# Patient Record
(demographics unavailable — no encounter records)

---

## 2024-11-09 NOTE — HISTORY OF PRESENT ILLNESS
[FreeTextEntry1] :  Patient is a 37 year old P0 presenting for an annual visit. LMP 10/18/2024. She is feeling well and is without complaints. She denies vaginal itching, odor and discharge. Denies urinary urgency, frequency and dysuria. She is currently sexually active.   OBHx: OBHx: 2006, TOP, DVC

## 2024-11-09 NOTE — PHYSICAL EXAM
[Chaperone Present] : A chaperone was present in the examining room during all aspects of the physical examination [41126] : A chaperone was present during the pelvic exam. [Appropriately responsive] : appropriately responsive [Alert] : alert [No Acute Distress] : no acute distress [Soft] : soft [Non-tender] : non-tender [Non-distended] : non-distended [No HSM] : No HSM [No Lesions] : no lesions [No Mass] : no mass [Oriented x3] : oriented x3 [Examination Of The Breasts] : a normal appearance [No Masses] : no breast masses were palpable [Labia Majora] : normal [Labia Minora] : normal [Normal] : normal [Uterine Adnexae] : normal [FreeTextEntry2] : Jazmine Carlson

## 2024-11-09 NOTE — PHYSICAL EXAM
[Chaperone Present] : A chaperone was present in the examining room during all aspects of the physical examination [21166] : A chaperone was present during the pelvic exam. [Appropriately responsive] : appropriately responsive [Alert] : alert [No Acute Distress] : no acute distress [Soft] : soft [Non-tender] : non-tender [Non-distended] : non-distended [No HSM] : No HSM [No Lesions] : no lesions [No Mass] : no mass [Oriented x3] : oriented x3 [Examination Of The Breasts] : a normal appearance [No Masses] : no breast masses were palpable [Labia Majora] : normal [Labia Minora] : normal [Normal] : normal [Uterine Adnexae] : normal [FreeTextEntry2] : Jazmine Carlson

## 2024-11-09 NOTE — DISCUSSION/SUMMARY
[FreeTextEntry1] : 37 year old P0 presenting for annual exam -f/u pap and GC/CT -Contraception: Trying to conceive; recommended Ovasitol powder, trying to conceive paperwork given -f/u PRN

## 2025-04-17 NOTE — PROCEDURE
[Transvaginal OB Sonogram] : Transvaginal OB Sonogram [Intrauterine Pregnancy] : intrauterine pregnancy [Yolk Sac] : yolk sac present [Fetal Heart] : fetal heart present [Transvaginal OB Sonogram WNL] : Transvaginal OB Sonogram WNL [FreeTextEntry1] : [id number]

## 2025-04-17 NOTE — DISCUSSION/SUMMARY
[FreeTextEntry1] : 37 year old P0 at 7w5d by LMP presenting with amenorrhea -f/u prenatal blood work drawn today -OB sonogram done- JW=378 -Scoliosis- Will schedule patient with Anesiaologist -RX sent for ASA to take at 12 weeks -Routine PNC reviewed including nutrition, exercise and safe medications in pregnancy -Depression Screening done today: PHQ2 = 1 -f/u 2 weeks for confirmation of pregnancy dating sonogram

## 2025-04-17 NOTE — PROCEDURE
[Transvaginal OB Sonogram] : Transvaginal OB Sonogram [Intrauterine Pregnancy] : intrauterine pregnancy [Yolk Sac] : yolk sac present [Fetal Heart] : fetal heart present [Transvaginal OB Sonogram WNL] : Transvaginal OB Sonogram WNL [FreeTextEntry1] : [id number]

## 2025-04-17 NOTE — HISTORY OF PRESENT ILLNESS
[FreeTextEntry1] : Patient is a 37 year  old . LMP 2025. She had a positive home pregnancy test. She is endorsing occasional nausea and breast tenderness.  OBHx:  2006, TOP, DVC

## 2025-04-17 NOTE — DISCUSSION/SUMMARY
[FreeTextEntry1] : 37 year old P0 at 7w5d by LMP presenting with amenorrhea -f/u prenatal blood work drawn today -OB sonogram done- UD=620 -Scoliosis- Will schedule patient with Anesiaologist -RX sent for ASA to take at 12 weeks -Routine PNC reviewed including nutrition, exercise and safe medications in pregnancy -Depression Screening done today: PHQ2 = 1 -f/u 2 weeks for confirmation of pregnancy dating sonogram

## 2025-04-17 NOTE — PHYSICAL EXAM
[Chaperoned Physical Exam] : A chaperone was present in the examining room during all aspects of the physical examination. [MA] : MA [Appropriately responsive] : appropriately responsive [Alert] : alert [No Acute Distress] : no acute distress [Soft] : soft [Non-tender] : non-tender [Non-distended] : non-distended [No HSM] : No HSM [No Lesions] : no lesions [No Mass] : no mass [Oriented x3] : oriented x3 [FreeTextEntry2] : Missy

## 2025-07-16 NOTE — DISCUSSION/SUMMARY
[FreeTextEntry1] : 39 y/o patient presents to office for anatomy scan  -Anatomy scan done today wnl (see official sono report)

## 2025-07-16 NOTE — LETTER GREETING
Post-Care Instructions: I reviewed with the patient in detail post-care instructions. Patient is to wear sunprotection, and avoid picking at any of the treated lesions. Pt may apply Vaseline to crusted or scabbing areas. Render Post-Care Instructions In Note?: no Detail Level: Detailed [Dear  ___] : Dear  [unfilled], [I had the pleasure of seeing your patient today.] : I had the pleasure of seeing your patient today Consent: The patient's consent was obtained including but not limited to risks of crusting, scabbing, blistering, scarring, darker or lighter pigmentary change, recurrence, incomplete removal and infection. Duration Of Freeze Thaw-Cycle (Seconds): 0 Medical Necessity Clause: This procedure was medically necessary because the lesions that were treated were: Medical Necessity Information: It is in your best interest to select a reason for this procedure from the list below. All of these items fulfill various CMS LCD requirements except the new and changing color options.

## 2025-07-16 NOTE — HISTORY OF PRESENT ILLNESS
[FreeTextEntry1] : 39 y/o female presents to office for anatomy scan. Patient is currently 20 weeks pregnant. Anatomy scan done today. No gross abnormalities noted. Normal growth. Normal fluid. Normal movement. +FHR (see official sono report).